# Patient Record
Sex: MALE | Race: WHITE | URBAN - METROPOLITAN AREA
[De-identification: names, ages, dates, MRNs, and addresses within clinical notes are randomized per-mention and may not be internally consistent; named-entity substitution may affect disease eponyms.]

---

## 2024-08-26 ENCOUNTER — OFFICE VISIT (OUTPATIENT)
Age: 39
End: 2024-08-26

## 2024-08-26 VITALS
SYSTOLIC BLOOD PRESSURE: 120 MMHG | HEART RATE: 77 BPM | HEIGHT: 71 IN | DIASTOLIC BLOOD PRESSURE: 78 MMHG | OXYGEN SATURATION: 98 % | WEIGHT: 165 LBS | TEMPERATURE: 97.7 F | BODY MASS INDEX: 23.1 KG/M2

## 2024-08-26 DIAGNOSIS — J40 BRONCHITIS: ICD-10-CM

## 2024-08-26 DIAGNOSIS — J22 LOWER RESPIRATORY TRACT INFECTION: Primary | ICD-10-CM

## 2024-08-26 RX ORDER — FLUTICASONE PROPIONATE AND SALMETEROL XINAFOATE 45; 21 UG/1; UG/1
2 AEROSOL, METERED RESPIRATORY (INHALATION) 2 TIMES DAILY
COMMUNITY

## 2024-08-26 RX ORDER — AZITHROMYCIN 250 MG/1
TABLET, FILM COATED ORAL
Qty: 6 TABLET | Refills: 0 | Status: SHIPPED | OUTPATIENT
Start: 2024-08-26 | End: 2024-09-05

## 2024-08-26 RX ORDER — METHYLPREDNISOLONE 4 MG
TABLET, DOSE PACK ORAL
Qty: 1 KIT | Refills: 0 | Status: SHIPPED | OUTPATIENT
Start: 2024-08-26 | End: 2024-09-01

## 2024-08-26 ASSESSMENT — ENCOUNTER SYMPTOMS
RHINORRHEA: 1
ABDOMINAL PAIN: 0
WHEEZING: 1
DIARRHEA: 0
VOMITING: 0
CHEST TIGHTNESS: 1
COUGH: 1
SORE THROAT: 1

## 2024-08-26 NOTE — PROGRESS NOTES
Mino Hyatt (:  1985) is a 38 y.o. male,New patient, here for evaluation of the following chief complaint(s):  Pharyngitis (Sore throat, runny nose, tight chest, sneezing, coughing, fatigue - symptoms x 5 days, wife and kids have been sick and wife was dx'd with bronchitis.)      Assessment & Plan :  Visit Diagnoses and Associated Orders       Lower respiratory tract infection    -  Primary    azithromycin (ZITHROMAX) 250 MG tablet [18259]           Bronchitis        methylPREDNISolone (MEDROL DOSEPACK) 4 MG tablet [4991]           ORDERS WITHOUT AN ASSOCIATED DIAGNOSIS    fluticasone-salmeterol (ADVAIR HFA) 45-21 MCG/ACT inhaler [47208]            Differential diagnoses: bronchitis, pneumonia, sinusitis, strep pharyngitis, viral pharyngitis, viral URI, allergic rhinitis, covid, influenza, otitis media, tonsillar abscess     Plan: He appears to have bronchitis. He will be prescribed azithromycin for the infection and a steroid pack for symptom relief. He was offered a cough medicine but declined as he states that his cough is pretty mild. He was encouraged to rest and increase fluid intake and advised to continue to take tylenol and/or ibuprofen for pain/fever relief. Follow-up with PCP as needed. Return precautions discussed. Follow up in 3 days if symptoms persist or if symptoms worsen.       Subjective :  HPI  Mino Hyatt is a 38 y.o. male who presents with complaints of a sore throat, mild cough, chest congestion, and runny nose. He states that he started with symptoms almost a week ago. He reports that his entire family has been sick with similar symptoms and his wife was recently treated for bronchitis. He states that his wife did have a negative covid test and he works from home and is not concerned about covid. He does have history of asthma and has noted chest congestion and mild wheezing. He has been using his advair inhaler twice a day with moderate improvement. He denies any fevers,  range of motion.      Cervical back: Normal range of motion.   Skin:     General: Skin is warm and dry.          An electronic signature was used to authenticate this note.    --AUSTIN Campuzano - CNP

## 2024-08-26 NOTE — PATIENT INSTRUCTIONS
New Prescriptions    AZITHROMYCIN (ZITHROMAX) 250 MG TABLET    500mg on day 1 followed by 250mg on days 2 - 5    METHYLPREDNISOLONE (MEDROL DOSEPACK) 4 MG TABLET    Take by mouth per package directions.      Take antibiotic as prescribed.  Take the steroid pack as directed.  Take tylenol and/or ibuprofen for pain/fever relief.  Encourage rest and increase fluid intake.  Follow-up with your PCP as needed.  Return for severe/worsening symptoms.  
normal...

## 2025-02-10 ENCOUNTER — OFFICE VISIT (OUTPATIENT)
Age: 40
End: 2025-02-10

## 2025-02-10 VITALS
HEIGHT: 71 IN | HEART RATE: 97 BPM | OXYGEN SATURATION: 98 % | WEIGHT: 166.8 LBS | BODY MASS INDEX: 23.35 KG/M2 | DIASTOLIC BLOOD PRESSURE: 80 MMHG | TEMPERATURE: 98.1 F | SYSTOLIC BLOOD PRESSURE: 122 MMHG

## 2025-02-10 DIAGNOSIS — J45.901 EXACERBATION OF ASTHMA, UNSPECIFIED ASTHMA SEVERITY, UNSPECIFIED WHETHER PERSISTENT: Primary | ICD-10-CM

## 2025-02-10 RX ORDER — DEXTROAMPHETAMINE SACCHARATE, AMPHETAMINE ASPARTATE MONOHYDRATE, DEXTROAMPHETAMINE SULFATE AND AMPHETAMINE SULFATE 5; 5; 5; 5 MG/1; MG/1; MG/1; MG/1
CAPSULE, EXTENDED RELEASE ORAL
COMMUNITY
Start: 2025-01-28

## 2025-02-10 RX ORDER — FLUTICASONE PROPIONATE AND SALMETEROL 100; 50 UG/1; UG/1
1 POWDER RESPIRATORY (INHALATION) 2 TIMES DAILY
COMMUNITY
Start: 2025-02-03

## 2025-02-10 RX ORDER — PREDNISONE 20 MG/1
40 TABLET ORAL DAILY
Qty: 10 TABLET | Refills: 0 | Status: SHIPPED | OUTPATIENT
Start: 2025-02-10 | End: 2025-02-15

## 2025-02-10 RX ORDER — DEXTROAMPHETAMINE SACCHARATE, AMPHETAMINE ASPARTATE, DEXTROAMPHETAMINE SULFATE AND AMPHETAMINE SULFATE 1.25; 1.25; 1.25; 1.25 MG/1; MG/1; MG/1; MG/1
TABLET ORAL
COMMUNITY

## 2025-02-10 RX ORDER — MONTELUKAST SODIUM 10 MG/1
10 TABLET ORAL NIGHTLY
Qty: 30 TABLET | Refills: 0 | Status: SHIPPED | OUTPATIENT
Start: 2025-02-10

## 2025-02-10 RX ORDER — FLUTICASONE PROPIONATE AND SALMETEROL 100; 50 UG/1; UG/1
POWDER RESPIRATORY (INHALATION)
COMMUNITY

## 2025-02-10 ASSESSMENT — ENCOUNTER SYMPTOMS
COUGH: 1
SHORTNESS OF BREATH: 1
CHEST TIGHTNESS: 1
SINUS PRESSURE: 0
SORE THROAT: 1
SINUS PAIN: 0

## 2025-02-10 NOTE — PROGRESS NOTES
danette Hyatt (:  1985) is a 39 y.o. male,Established patient, here for evaluation of the following chief complaint(s):  Congestion (Pt c/o chest congestion and soreness x 1 week)      ASSESSMENT/PLAN:    ICD-10-CM    1. Exacerbation of asthma, unspecified asthma severity, unspecified whether persistent  J45.901 predniSONE (DELTASONE) 20 MG tablet     montelukast (SINGULAIR) 10 MG tablet        Asthma exacerbation  Using inhalers including rescue inhaler  Prednisone 40 mg take as directed  Prednisone may cause insomnia and anxiety while taking it. Discussed side effects of prednisone and patient verbalized understanding    Patient verbalized understanding of printed and verbal discharge instructions including follow up care.   Follow up with your primary care provider for persistent symptoms.   Follow up in 7 days if symptoms persist or if symptoms worsen.    SUBJECTIVE/OBJECTIVE:  Chest congestion and sore throat for over a week.       History provided by:  Patient          Vitals:    02/10/25 0855 02/10/25 0913   BP: 132/70 122/80   Site: Right Upper Arm Right Upper Arm   Position: Sitting Sitting   Cuff Size: Medium Adult Medium Adult   Pulse: 97    Temp: 98.1 °F (36.7 °C)    TempSrc: Oral    SpO2: 98%    Weight: 75.7 kg (166 lb 12.8 oz)    Height: 1.803 m (5' 11\")        Review of Systems   Constitutional:  Negative for fever.   HENT:  Positive for sore throat. Negative for ear discharge, ear pain, sinus pressure and sinus pain.    Respiratory:  Positive for cough, chest tightness and shortness of breath.        Physical Exam  Vitals and nursing note reviewed.   Constitutional:       Appearance: Normal appearance.   HENT:      Right Ear: Hearing, tympanic membrane, ear canal and external ear normal.      Left Ear: Hearing, tympanic membrane, ear canal and external ear normal.      Nose:      Right Sinus: No maxillary sinus tenderness or frontal sinus tenderness.      Left Sinus: No maxillary